# Patient Record
Sex: MALE | Race: WHITE | NOT HISPANIC OR LATINO | URBAN - METROPOLITAN AREA
[De-identification: names, ages, dates, MRNs, and addresses within clinical notes are randomized per-mention and may not be internally consistent; named-entity substitution may affect disease eponyms.]

---

## 2019-05-09 ENCOUNTER — HOSPITAL ENCOUNTER (EMERGENCY)
Facility: MEDICAL CENTER | Age: 69
End: 2019-05-09
Attending: EMERGENCY MEDICINE
Payer: MEDICARE

## 2019-05-09 VITALS
OXYGEN SATURATION: 95 % | HEIGHT: 70 IN | BODY MASS INDEX: 34.72 KG/M2 | WEIGHT: 242.51 LBS | DIASTOLIC BLOOD PRESSURE: 93 MMHG | HEART RATE: 68 BPM | TEMPERATURE: 97.1 F | RESPIRATION RATE: 16 BRPM | SYSTOLIC BLOOD PRESSURE: 164 MMHG

## 2019-05-09 DIAGNOSIS — I10 HYPERTENSION, UNSPECIFIED TYPE: ICD-10-CM

## 2019-05-09 LAB
ANION GAP SERPL CALC-SCNC: 8 MMOL/L (ref 0–11.9)
BUN SERPL-MCNC: 14 MG/DL (ref 8–22)
CALCIUM SERPL-MCNC: 8.8 MG/DL (ref 8.5–10.5)
CHLORIDE SERPL-SCNC: 105 MMOL/L (ref 96–112)
CO2 SERPL-SCNC: 24 MMOL/L (ref 20–33)
CREAT SERPL-MCNC: 0.74 MG/DL (ref 0.5–1.4)
EKG IMPRESSION: NORMAL
GLUCOSE BLD-MCNC: 151 MG/DL (ref 65–99)
GLUCOSE SERPL-MCNC: 165 MG/DL (ref 65–99)
POTASSIUM SERPL-SCNC: 3.8 MMOL/L (ref 3.6–5.5)
SODIUM SERPL-SCNC: 137 MMOL/L (ref 135–145)
TROPONIN I SERPL-MCNC: <0.01 NG/ML (ref 0–0.04)

## 2019-05-09 PROCEDURE — 93005 ELECTROCARDIOGRAM TRACING: CPT | Performed by: EMERGENCY MEDICINE

## 2019-05-09 PROCEDURE — 84484 ASSAY OF TROPONIN QUANT: CPT

## 2019-05-09 PROCEDURE — 99283 EMERGENCY DEPT VISIT LOW MDM: CPT

## 2019-05-09 PROCEDURE — 80048 BASIC METABOLIC PNL TOTAL CA: CPT

## 2019-05-09 PROCEDURE — 82962 GLUCOSE BLOOD TEST: CPT

## 2019-05-09 PROCEDURE — 36415 COLL VENOUS BLD VENIPUNCTURE: CPT

## 2019-05-09 RX ORDER — ATORVASTATIN CALCIUM 10 MG/1
10 TABLET, FILM COATED ORAL NIGHTLY
COMMUNITY

## 2019-05-09 RX ORDER — METOPROLOL TARTRATE 100 MG/1
100 TABLET ORAL 2 TIMES DAILY
COMMUNITY

## 2019-05-09 RX ORDER — OMEPRAZOLE 20 MG/1
20 CAPSULE, DELAYED RELEASE ORAL 2 TIMES DAILY
COMMUNITY

## 2019-05-09 RX ORDER — BENAZEPRIL HYDROCHLORIDE 5 MG/1
5 TABLET ORAL DAILY
COMMUNITY

## 2019-05-09 RX ORDER — GLIPIZIDE 5 MG/1
5 TABLET ORAL 2 TIMES DAILY
COMMUNITY

## 2019-05-09 RX ORDER — LORAZEPAM 1 MG/1
1 TABLET ORAL 2 TIMES DAILY
COMMUNITY

## 2019-05-09 RX ORDER — FENOFIBRATE 145 MG/1
145 TABLET, COATED ORAL DAILY
COMMUNITY

## 2019-05-09 ASSESSMENT — LIFESTYLE VARIABLES: DO YOU DRINK ALCOHOL: NO

## 2019-05-09 NOTE — ED TRIAGE NOTES
Chief Complaint   Patient presents with   • Hypertension     pt reports to be able to take BP at home and had hypertension. pt states mild HA today. pt denies hx of stroke.    • Headache   • GLF     pt reports falling out of bed 5 days ago, was checked at [facility]     Explained to pt triage process, made pt aware to tell this RN/staff of any changes/concerns, pt verbalized understanding of process and instructions given. Pt to ER [facility].

## 2019-05-10 NOTE — ED NOTES
"Pt discharged home via ambulatory to lobby with steady gait, RN and family accompanying. Pt in possession of belongings. Pt provided discharge education and information pertaining to medications and follow up appointments. Pt received copy of discharge instructions and verbalized understanding.     BP (!) 164/93   Pulse 68   Temp 36.2 °C (97.1 °F) (Temporal)   Resp 16   Ht 1.778 m (5' 10\")   Wt 110 kg (242 lb 8.1 oz)   SpO2 95%   BMI 34.80 kg/m²   "

## 2019-05-10 NOTE — ED NOTES
Pt ambulatory to BL 16 with steady gait from [facility], in gown and on monitor, chart up for ERP.

## 2019-05-10 NOTE — ED PROVIDER NOTES
ED Provider Note    ER PROVIDER NOTE        CHIEF COMPLAINT  Chief Complaint   Patient presents with   • Hypertension     pt reports to be able to take BP at home and had hypertension. pt states mild HA today. pt denies hx of stroke.    • Headache   • GLF     pt reports falling out of bed 5 days ago, was checked at [facility]       [facility]  Ramy Abad is a 68 y.o. male who presents to the emergency department complaining of concern of his blood pressure.  Patient is visiting from [place], and recently arrived to [place].  He reports that his blood pressure was elevated this morning around 170 over it seemed to go down and then returned later in the day to similar readings.  He denies any current acute symptoms.  He denies any chest pain shortness of breath leg pain or swelling.  No abdominal pain nausea or vomiting.  No problems urinating.  He denies any visual symptoms or focal weakness numbness or tingling.  He states he does have a slight headache, he hit his head in bed 5 days ago, no LOC, was checked out at urgent care with no concerns.  He reports his headache has been gradually improving over that time and is only minimally there at this time.     Does not take any blood thinners    REVIEW OF SYSTEMS  Pertinent positives include concerns of blood pressure. Pertinent negatives include no chest pain. See HPI for details. All other systems reviewed and are negative.    PAST MEDICAL HISTORY   has a past medical history of Diabetes (HCC); Hernia of abdominal cavity; History of heart artery stent; and MI (myocardial infarction) ([facility]).    SURGICAL HISTORY  patient denies any surgical history    FAMILY HISTORY  History reviewed. No pertinent family history.    SOCIAL HISTORY  Social History     Social History   • Marital status: N/A     Spouse name: N/A   • Number of children: N/A   • Years of education: N/A     Social History Main Topics   • Smoking status: Former Smoker   • Smokeless tobacco: Never Used   • Alcohol use Yes       "Comment: rare   • Drug use: No   • Sexual activity: Not on file     Other Topics Concern   • Not on file     Social History Narrative   • No narrative on file      History   Drug Use No       CURRENT MEDICATIONS  Home Medications     Reviewed by Brandy Grey, Pharmacy Intern (Pharmacy Intern) on 05/09/19 at 1911  Med List Status: Complete   Medication Last Dose Status   atorvastatin (LIPITOR) 10 MG Tab 5/8/2019 Active   benazepril (LOTENSIN) 5 MG Tab 5/9/2019 Active   fenofibrate (TRICOR) 145 MG Tab 5/9/2019 Active   glipiZIDE (GLUCOTROL) 5 MG Tab 5/9/2019 Active   LORazepam (ATIVAN) 1 MG Tab 5/9/2019 Active   metoprolol (LOPRESSOR) 100 MG Tab 5/9/2019 Active   omeprazole (PRILOSEC) 20 MG delayed-release capsule 5/9/2019 Active   SITagliptin (JANUVIA) 100 MG Tab 5/9/2019 Active                ALLERGIES  No Known Allergies    PHYSICAL EXAM  VITAL SIGNS: BP (!) 164/93   Pulse 66   Temp 36.2 °C (97.1 °F) (Temporal)   Resp 14   Ht 1.778 m (5' 10\")   Wt 110 kg (242 lb 8.1 oz)   SpO2 94%   BMI 34.80 kg/m²   Pulse ox interpretation: I interpret this pulse ox as normal.    Constitutional: Alert in no apparent distress.  HENT: No signs of trauma, Bilateral external ears normal, Nose normal.   Eyes: Pupils are equal and reactive, Conjunctiva normal, Non-icteric.   Neck: Normal range of motion, No tenderness, Supple, No stridor.   Lymphatic: No lymphadenopathy noted.   Cardiovascular: Regular rate and rhythm, no murmurs.   Thorax & Lungs: Normal breath sounds, No respiratory distress, No wheezing, No chest tenderness.   Abdomen: Bowel sounds normal, Soft, No tenderness, No masses, No pulsatile masses. No peritoneal signs.  Skin: Warm, Dry, No erythema, No rash.   Back: No bony tenderness, No CVA tenderness.   Extremities: Intact distal pulses, No edema, No tenderness, No cyanosis, Negative Roberto's sign.  Musculoskeletal: Good range of motion in all major joints. No tenderness to palpation or major deformities noted. "   Neurologic: Alert, cranial nerves intact, speech is appropriate or not slurred, upper extremities bilaterally exhibit no drift, no dysmetria, 5 out of 5 strength with bilateral bicep/tricep/, sensation intact to light touch throughout upper extremities. Lower extremities strength 5 out of 5 thigh extension/flexion/abduction/adduction, knee extension/flexion, dorsiflexion plantar flexion. No clonus.  2+ patella reflexes.  sensation intact to light touch.  No focal deficits noted. Ambulates with steady gait, steady tandem gait  Psychiatric: Affect normal, Judgment normal, Mood normal.        DIAGNOSTIC STUDIES / PROCEDURES    Results for orders placed or performed during the hospital encounter of 19   BASIC METABOLIC PANEL   Result Value Ref Range    Sodium 137 135 - 145 mmol/L    Potassium 3.8 3.6 - 5.5 mmol/L    Chloride 105 96 - 112 mmol/L    Co2 24 20 - 33 mmol/L    Glucose 165 (H) 65 - 99 mg/dL    Bun 14 8 - 22 mg/dL    Creatinine 0.74 0.50 - 1.40 mg/dL    Calcium 8.8 8.5 - 10.5 mg/dL    Anion Gap 8.0 0.0 - 11.9   TROPONIN   Result Value Ref Range    Troponin I <0.01 0.00 - 0.04 ng/mL   ESTIMATED GFR   Result Value Ref Range    GFR If African American >60 >60 mL/min/1.73 m 2    GFR If Non African American >60 >60 mL/min/1.73 m 2   ACCU-CHEK GLUCOSE   Result Value Ref Range    Glucose - Accu-Ck 151 (H) 65 - 99 mg/dL   EKG (NOW)   Result Value Ref Range    Report       [facility] Emergency Dept.    Test Date:  2019  Pt Name:    ANETTE FRAIRE                 Department: ER  MRN:        [id number]                      Room:        16  Gender:     Male                         Technician: [id number]  :        1950                   Requested By:JANNETTE BLOOM  Order #:    [id number]                    Reading MD:    Measurements  Intervals                                Axis  Rate:       64                           P:          0  AR:         206                          QRS:         5  QRSD:       94                           T:          16  QT:         412  QTc:        425    Interpretive Statements  SINUS RHYTHM  PROBABLE LEFT ATRIAL ABNORMALITY  EARLY PRECORDIAL R/S TRANSITION  BORDERLINE INFERIOR Q WAVES  No previous ECG available for comparison           RADIOLOGY  No orders to display     The radiologist's interpretation of all radiological studies have been reviewed by me.    COURSE & MEDICAL DECISION MAKING  Nursing notes, VS, PMSFHx reviewed in chart.    6:02 PM Patient seen and examined at bedside.Ordered for BMP, troponin, ECG to evaluate his symptoms.     7:36 PM  Patient reevaluated, he still continues to be asymptomatic.  Updated on results and plan for discharge          Decision Making:  This is a 68 y.o. male presenting with concerns of his blood pressure.  Does not appear to represent emergent pathology or endorgan damage and he is fully asymptomatic with improved blood pressures here.  There may be some contribution from [place] and he is returning in 2 days    There is no chest pain, shortness of breath, palpitations, VARGAS or other cardiopulmonary sx and along with the unremarkable ECG and troponin there is no evidence of end organ damage.  There is no neurologic deficit or other neurologic symptoms to suggest end organ damage, he does have a improving mild headache from a fall a few days ago so I do not think there is dangerous pathology related to this such as bleed.  Normal creatinine without evidence of renal dysfunction thus at this point will not introduce further anti-hypertensive as there is more risk of adverse outcome from acutely bringing pressure down as noted in multiple reviews and clinical guidelines.  Pt has close PCP follow up and will refer for outpt management.     The patient will return for new or worsening symptoms and is stable at the time of discharge.    The patient is referred to a primary physician for blood pressure  management, diabetic screening, and for all other preventative health concerns.      DISPOSITION:  Patient will be discharged home in stable condition.    FOLLOW UP:  With your primary care doctor in [place]    In 1 week        OUTPATIENT MEDICATIONS:  New Prescriptions    No medications on file         FINAL IMPRESSION  1. Hypertension, unspecified type     *     The note accurately reflects work and decisions made by me.  Jamil Kwan  5/9/2019  7:43 PM

## 2021-07-19 ENCOUNTER — OFFICE (OUTPATIENT)
Dept: URBAN - METROPOLITAN AREA CLINIC 67 | Facility: CLINIC | Age: 71
End: 2021-07-19

## 2021-07-19 VITALS
WEIGHT: 237 LBS | HEIGHT: 70 IN | SYSTOLIC BLOOD PRESSURE: 120 MMHG | TEMPERATURE: 97.2 F | DIASTOLIC BLOOD PRESSURE: 90 MMHG

## 2021-07-19 DIAGNOSIS — R19.4 ALTERED BOWEL FUNCTION: ICD-10-CM

## 2021-07-19 DIAGNOSIS — R10.13 EPIGASTRIC PAIN: ICD-10-CM

## 2021-07-19 DIAGNOSIS — D12.6: ICD-10-CM

## 2021-07-19 DIAGNOSIS — K21.9 GASTROESOPHAGEAL REFLUX DISEASE: ICD-10-CM

## 2021-07-19 PROCEDURE — 99204 OFFICE O/P NEW MOD 45 MIN: CPT | Performed by: INTERNAL MEDICINE

## 2021-07-19 NOTE — SERVICEHPINOTES
The patient is a pleasant 70-year-old gentleman referred for evaluation of GERD, IBS, constipation, and a history of polyps. Patient has a long history of anxiety and IBS type symptoms. Recently he has been constipated. We discussed trying flaxseed/magnesium/senna. He did have 3 polyps removed on his last colonoscopy 4-5 years ago, he is due. He also gets intermittent reflux, times epigastric discomfort, he is not on PPIs. He has not had alarm symptoms otherwise. He is here now to discuss further evaluation.

## 2021-09-01 ENCOUNTER — AMBULATORY SURGICAL CENTER (OUTPATIENT)
Dept: URBAN - METROPOLITAN AREA SURGERY 42 | Facility: SURGERY | Age: 71
End: 2021-09-01

## 2021-09-01 VITALS
TEMPERATURE: 97 F | HEART RATE: 79 BPM | WEIGHT: 235 LBS | DIASTOLIC BLOOD PRESSURE: 82 MMHG | RESPIRATION RATE: 7 BRPM | HEIGHT: 70 IN | SYSTOLIC BLOOD PRESSURE: 157 MMHG | OXYGEN SATURATION: 99 %

## 2021-09-01 DIAGNOSIS — K63.5 POLYP OF COLON: ICD-10-CM

## 2021-09-01 DIAGNOSIS — K21.9 GASTRO-ESOPHAGEAL REFLUX DISEASE WITHOUT ESOPHAGITIS: ICD-10-CM

## 2021-09-01 DIAGNOSIS — K31.89 OTHER DISEASES OF STOMACH AND DUODENUM: ICD-10-CM

## 2021-09-01 PROCEDURE — 45380 COLONOSCOPY AND BIOPSY: CPT | Mod: 59 | Performed by: INTERNAL MEDICINE

## 2021-09-01 PROCEDURE — 43239 EGD BIOPSY SINGLE/MULTIPLE: CPT | Performed by: INTERNAL MEDICINE

## 2021-09-01 PROCEDURE — 45385 COLONOSCOPY W/LESION REMOVAL: CPT | Performed by: INTERNAL MEDICINE

## 2021-10-12 ENCOUNTER — OFFICE (OUTPATIENT)
Dept: URBAN - METROPOLITAN AREA CLINIC 66 | Facility: CLINIC | Age: 71
End: 2021-10-12

## 2021-10-12 VITALS
WEIGHT: 236 LBS | SYSTOLIC BLOOD PRESSURE: 137 MMHG | HEIGHT: 70 IN | TEMPERATURE: 97.3 F | DIASTOLIC BLOOD PRESSURE: 79 MMHG

## 2021-10-12 DIAGNOSIS — K59.00 CONSTIPATION: ICD-10-CM

## 2021-10-12 PROCEDURE — 99213 OFFICE O/P EST LOW 20 MIN: CPT | Performed by: INTERNAL MEDICINE

## 2021-10-12 NOTE — SERVICEHPINOTES
The patient is a pleasant 71-year-old gentleman referred for evaluation of GERD, IBS, constipation, and a history of polyps. The patient has a long history of anxiety and IBS type symptoms. Recently he has been constipated. We discussed trying flaxseed/magnesium/senna (up until now he has only been on flax seed a QOD Fleets enema, and prn Mg++Citrate). On his 9/1/2021 EGD/Colon: the 3 polyps were adenomatous and a 3-year follow-up was recommended. He has IBS, gastric and duodenal biopsies were negative. I encouraged him to look into the [employer] handouts. He also gets intermittent reflux, times epigastric discomfort, he is not on PPIs. He has not had alarm symptoms otherwise. He is here now to discuss further evaluation.

## 2021-11-08 ENCOUNTER — OFFICE (OUTPATIENT)
Dept: URBAN - METROPOLITAN AREA CLINIC 66 | Facility: CLINIC | Age: 71
End: 2021-11-08

## 2021-11-08 VITALS
SYSTOLIC BLOOD PRESSURE: 127 MMHG | TEMPERATURE: 96.8 F | WEIGHT: 235 LBS | HEIGHT: 70 IN | DIASTOLIC BLOOD PRESSURE: 80 MMHG

## 2021-11-08 DIAGNOSIS — K59.00 CONSTIPATION: ICD-10-CM

## 2021-11-08 DIAGNOSIS — R19.4 ALTERED BOWEL FUNCTION: ICD-10-CM

## 2021-11-08 PROCEDURE — 99213 OFFICE O/P EST LOW 20 MIN: CPT | Performed by: INTERNAL MEDICINE

## 2021-11-08 NOTE — SERVICEHPINOTES
The patient is a pleasant 71-year-old gentleman referred for evaluation of GERD, IBS, constipation, and a history of polyps. The patient has a long history of anxiety and IBS type symptoms. Recently he has been constipated. We discussed trying flaxseed/magnesium/senna (up until now he has only been on flax seed a QOD Fleets enema, and prn Mg++Citrate), and this helped minimally, so we'll now add Linzess 290 ug/d. On his 9/1/2021 EGD/Colon: the 3 polyps were adenomatous and a 3-year follow-up was recommended. He has IBS, gastric and duodenal biopsies were negative. I encouraged him to look into the [employer] handouts. He remains quite anxious, and has abdominal discomfort so we'll get a CT scan as well. He is here now to discuss further evaluation.

## 2022-09-16 ENCOUNTER — OFFICE (OUTPATIENT)
Dept: URBAN - METROPOLITAN AREA CLINIC 67 | Facility: CLINIC | Age: 72
End: 2022-09-16

## 2022-09-16 VITALS — SYSTOLIC BLOOD PRESSURE: 120 MMHG | WEIGHT: 226 LBS | DIASTOLIC BLOOD PRESSURE: 79 MMHG | HEIGHT: 70 IN

## 2022-09-16 DIAGNOSIS — K59.00 CONSTIPATION: ICD-10-CM

## 2022-09-16 PROCEDURE — 99203 OFFICE O/P NEW LOW 30 MIN: CPT | Performed by: INTERNAL MEDICINE

## 2022-09-16 NOTE — SERVICEHPINOTES
The patient is a pleasant 72-year-old gentleman referred for evaluation of GERD, IBS, constipation, and a history of polyps. The patient has a long history of anxiety and IBS type symptoms. Recently he has been constipated. We discussed trying flaxseed/magnesium/senna (up until now he has only been on flax seed a QOD Fleets enema, and prn Mg++Citrate), and this helped minimally, so we then added Linzess 290 ug/d. On his 9/1/2021 EGD/Colon: the 3 polyps were adenomatous and a 3-year follow-up was recommended. He has IBS, gastric and duodenal biopsies were negative. I encouraged him to look into the [employer] handouts. He remains quite anxious, and has abdominal discomfort did get CT scan was negative. He will try Trulance and compare it to Linzess.

## 2023-04-05 ENCOUNTER — OFFICE (OUTPATIENT)
Dept: URBAN - METROPOLITAN AREA CLINIC 67 | Facility: CLINIC | Age: 73
End: 2023-04-05

## 2023-04-05 VITALS — HEIGHT: 70 IN | WEIGHT: 232 LBS | SYSTOLIC BLOOD PRESSURE: 112 MMHG | DIASTOLIC BLOOD PRESSURE: 71 MMHG

## 2023-04-05 DIAGNOSIS — K59.00 CONSTIPATION: ICD-10-CM

## 2023-04-05 PROCEDURE — 99213 OFFICE O/P EST LOW 20 MIN: CPT | Performed by: INTERNAL MEDICINE

## 2023-04-05 PROCEDURE — 99213 OFFICE O/P EST LOW 20 MIN: CPT | Performed by: NURSE PRACTITIONER

## 2023-04-05 NOTE — SERVICEHPINOTES
AMAURY CAMPBELL   returns today for follow-up from last visit on   9/16/2022  .    Previously followed and last seen by Dr. Hunter with the following notes:  "The patient is a pleasant 72-year-old gentleman referred for evaluation of GERD, IBS, constipation, and a history of polyps. The patient has a long history of anxiety and IBS type symptoms. Recently he has been constipated. We discussed trying flaxseed/magnesium/senna (up until now he has only been on flax seed a QOD Fleets enema, and prn Mg++Citrate), and this helped minimally, so we then added Linzess 290 ug/d. On his 9/1/2021 EGD/Colon: the 3 polyps were adenomatous and a 3-year follow-up was recommended. He has IBS, gastric and duodenal biopsies were negative. I encouraged him to look into the [employer] handouts. He remains quite anxious, and has abdominal discomfort did get CT scan was negative. He will try Trulance and compare it to Linzess
br
br   A/P: Constipation - We had a lengthy discussion with regard to his constipation. He has lower abdominal crampiness, and rather than doing the enemas every other day, I encouraged him to continue the flaxseed, chelsi up to 4 tablets from 2-3 magnesium tablets at bedtime, and up to 4 senna tablets at bedtime as well. We've added Linzess at 290 ug/d--he will compare different doses to Trulance. He can still use the fleets enemas when necessary. We will me back in the early part of 2022 to compare notes. Reassurance was given again with regard to the findings on his colonoscopy on September 1, 2021. His CT scan in 12/2021 was negative."
br
br
4/5/23: Patient presents today thinking that he is due for colonoscopy but also here to discuss his ongoing chronic constipation issues. He also contacted our office on March 29, 2023 requesting to speak to Dr. Hunter urgently for severe right sided abdominal pain.  He now tells me that it is pain was located in the whole right abdomen closer to the mid axillary line it lasted 5 seconds before bowel movements.  Next day he went into the ER to be evaluated (not at [facility]) and apparently had an x-ray of his hip and some kind of imaging which was normal.  No further pain.
br
Currently he is using Linzess 145 mcg every 2-3 days as he was getting frequent diarrhea with daily use currently he gets 1-2 loose stools only which is a significant improvement from before but then he needs to strain for bowel movements.  He only takes 1 magnesium pill a day.  He was given Trulance samples to compare its effect to the Linzess, tried it once, it seemed to work, unclear why pt did not switch over.

## 2023-12-01 ENCOUNTER — OFFICE (OUTPATIENT)
Dept: URBAN - METROPOLITAN AREA CLINIC 66 | Facility: CLINIC | Age: 73
End: 2023-12-01

## 2023-12-01 VITALS — DIASTOLIC BLOOD PRESSURE: 80 MMHG | WEIGHT: 225 LBS | SYSTOLIC BLOOD PRESSURE: 118 MMHG | HEIGHT: 70 IN

## 2023-12-01 DIAGNOSIS — R10.13 EPIGASTRIC PAIN: ICD-10-CM

## 2023-12-01 DIAGNOSIS — K59.00 CONSTIPATION: ICD-10-CM

## 2023-12-01 DIAGNOSIS — K21.9 GASTROESOPHAGEAL REFLUX DISEASE: ICD-10-CM

## 2023-12-01 DIAGNOSIS — K63.5 POLYP OF COLON: ICD-10-CM

## 2023-12-01 PROCEDURE — 99213 OFFICE O/P EST LOW 20 MIN: CPT | Performed by: INTERNAL MEDICINE

## 2024-09-25 ENCOUNTER — OFFICE (OUTPATIENT)
Dept: URBAN - METROPOLITAN AREA CLINIC 67 | Facility: CLINIC | Age: 74
End: 2024-09-25

## 2024-09-25 VITALS — DIASTOLIC BLOOD PRESSURE: 80 MMHG | WEIGHT: 246 LBS | HEIGHT: 70 IN | SYSTOLIC BLOOD PRESSURE: 138 MMHG

## 2024-09-25 DIAGNOSIS — K63.5 POLYP OF COLON: ICD-10-CM

## 2024-09-25 DIAGNOSIS — R10.13 EPIGASTRIC PAIN: ICD-10-CM

## 2024-09-25 DIAGNOSIS — K21.9 GASTROESOPHAGEAL REFLUX DISEASE: ICD-10-CM

## 2024-09-25 PROCEDURE — 99203 OFFICE O/P NEW LOW 30 MIN: CPT | Performed by: INTERNAL MEDICINE

## 2024-09-25 NOTE — SERVICEHPINOTES
I saw Luciano in the office, and he is doing much better on the Linzess once a day, Though he will have a number of days where he will not go.  He would like to try Trulance, samples were given, though he is aware it's not covered by insurance.  He still gets nocturnal regurgitation, so rather than taking pantoprazole 40 mg before lunch, he will do 20 before breakfast and 20 before dinner, and add Gaviscon 4 tablets at bedtime.  Overall he is doing well.  He states he is lost 7 pounds simply because he lost his partner of 14 years approximately 1 year ago, and he simply is not eating as much.  He feels well otherwise. He is due for his upper endoscopy and colonoscopy in October 2024.

## 2025-02-10 ENCOUNTER — AMBULATORY SURGICAL CENTER (OUTPATIENT)
Dept: URBAN - METROPOLITAN AREA SURGERY 42 | Facility: SURGERY | Age: 75
End: 2025-02-10

## 2025-02-10 VITALS
TEMPERATURE: 97.4 F | RESPIRATION RATE: 20 BRPM | OXYGEN SATURATION: 97 % | HEIGHT: 70 IN | DIASTOLIC BLOOD PRESSURE: 70 MMHG | SYSTOLIC BLOOD PRESSURE: 151 MMHG | HEART RATE: 69 BPM | WEIGHT: 246 LBS

## 2025-02-10 DIAGNOSIS — K31.89 OTHER DISEASES OF STOMACH AND DUODENUM: ICD-10-CM

## 2025-02-10 DIAGNOSIS — K29.70 GASTRITIS, UNSPECIFIED, WITHOUT BLEEDING: ICD-10-CM

## 2025-02-10 DIAGNOSIS — K63.5 POLYP OF COLON: ICD-10-CM

## 2025-02-10 DIAGNOSIS — K20.80 OTHER ESOPHAGITIS WITHOUT BLEEDING: ICD-10-CM

## 2025-02-10 PROCEDURE — 45380 COLONOSCOPY AND BIOPSY: CPT | Performed by: INTERNAL MEDICINE

## 2025-02-10 PROCEDURE — 43239 EGD BIOPSY SINGLE/MULTIPLE: CPT | Performed by: INTERNAL MEDICINE
